# Patient Record
Sex: FEMALE | Race: BLACK OR AFRICAN AMERICAN | Employment: FULL TIME | URBAN - METROPOLITAN AREA
[De-identification: names, ages, dates, MRNs, and addresses within clinical notes are randomized per-mention and may not be internally consistent; named-entity substitution may affect disease eponyms.]

---

## 2023-03-02 LAB
BASOPHILS (10*3/UL) IN BLOOD BY AUTOMATED COUNT: 0.02 X10E9/L (ref 0–0.1)
BASOPHILS/100 LEUKOCYTES IN BLOOD BY AUTOMATED COUNT: 0.5 % (ref 0–2)
DEAMIDATED GLIADIN PEPTIDE IGA: <1 U/ML (ref 0–14)
DEAMIDATED GLIADIN PEPTIDE IGG: 2 U/ML (ref 0–14)
EOSINOPHILS (10*3/UL) IN BLOOD BY AUTOMATED COUNT: 0.03 X10E9/L (ref 0–0.7)
EOSINOPHILS/100 LEUKOCYTES IN BLOOD BY AUTOMATED COUNT: 0.7 % (ref 0–6)
ERYTHROCYTE DISTRIBUTION WIDTH (RATIO) BY AUTOMATED COUNT: 15.6 % (ref 11.5–14.5)
ERYTHROCYTE MEAN CORPUSCULAR HEMOGLOBIN CONCENTRATION (G/DL) BY AUTOMATED: 31.5 G/DL (ref 32–36)
ERYTHROCYTE MEAN CORPUSCULAR VOLUME (FL) BY AUTOMATED COUNT: 77 FL (ref 80–100)
ERYTHROCYTES (10*6/UL) IN BLOOD BY AUTOMATED COUNT: 4.51 X10E12/L (ref 4–5.2)
HEMATOCRIT (%) IN BLOOD BY AUTOMATED COUNT: 34.6 % (ref 36–46)
HEMOGLOBIN (G/DL) IN BLOOD: 10.9 G/DL (ref 12–16)
IMMATURE GRANULOCYTES/100 LEUKOCYTES IN BLOOD BY AUTOMATED COUNT: 0.2 % (ref 0–0.9)
LEUKOCYTES (10*3/UL) IN BLOOD BY AUTOMATED COUNT: 4.3 X10E9/L (ref 4.4–11.3)
LIPASE (U/L) IN SER/PLAS: 17 U/L (ref 9–82)
LYMPHOCYTES (10*3/UL) IN BLOOD BY AUTOMATED COUNT: 1.19 X10E9/L (ref 1.2–4.8)
LYMPHOCYTES/100 LEUKOCYTES IN BLOOD BY AUTOMATED COUNT: 27.9 % (ref 13–44)
MONOCYTES (10*3/UL) IN BLOOD BY AUTOMATED COUNT: 0.53 X10E9/L (ref 0.1–1)
MONOCYTES/100 LEUKOCYTES IN BLOOD BY AUTOMATED COUNT: 12.4 % (ref 2–10)
NEUTROPHILS (10*3/UL) IN BLOOD BY AUTOMATED COUNT: 2.49 X10E9/L (ref 1.2–7.7)
NEUTROPHILS/100 LEUKOCYTES IN BLOOD BY AUTOMATED COUNT: 58.3 % (ref 40–80)
NRBC (PER 100 WBCS) BY AUTOMATED COUNT: 0 /100 WBC (ref 0–0)
PLATELETS (10*3/UL) IN BLOOD AUTOMATED COUNT: 261 X10E9/L (ref 150–450)
TISSUE TRANSGLUTAMINASE IGG: <1 U/ML (ref 0–14)
TISSUE TRANSGLUTAMINASE, IGA: <1 U/ML (ref 0–14)

## 2023-03-20 DIAGNOSIS — R93.2 ABNORMAL LIVER ULTRASOUND: Primary | ICD-10-CM

## 2023-03-21 ENCOUNTER — TELEPHONE (OUTPATIENT)
Dept: PRIMARY CARE | Facility: CLINIC | Age: 33
End: 2023-03-21
Payer: COMMERCIAL

## 2023-03-21 NOTE — TELEPHONE ENCOUNTER
----- Message from Shakira Marshall, DO sent at 3/20/2023  9:48 PM EDT -----  Va,   The ultrasound of your abdomen shows most likely hemangiomas in the liver (which is a BENIGN cluster of blood vessels that are harmless). However, one of them is on the larger size and I would like to rule out any additional abnormalities with an MRI of the liver. This can be done with contrast but should not be an issue as your kidneys are functioning normally and can filter out the contrast that is injected. I will order the MRI of the liver and you can call the imaging to have this scheduled. Let me know if you have any questions.   Regards, Federico

## 2023-03-24 LAB — C. DIFFICILE TOXIN, PCR: NOT DETECTED

## 2023-03-28 LAB
CRYPTOSPORIDIUM ANTIGEN-DATA CONVERSION: NEGATIVE
GIARDIA LAMBLIA AG-DATA CONVERSION: NEGATIVE
HELICOBACTER PYLORI AG: POSITIVE
OVA + PARASITE EXAM: NEGATIVE

## 2023-03-30 DIAGNOSIS — A04.8 H. PYLORI INFECTION: Primary | ICD-10-CM

## 2023-03-30 RX ORDER — BISMUTH SUBSALICYLATE 525 MG/30ML
30 LIQUID ORAL 4 TIMES DAILY
Qty: 360 ML | Refills: 0 | Status: SHIPPED | OUTPATIENT
Start: 2023-03-30 | End: 2023-04-13

## 2023-03-30 RX ORDER — OMEPRAZOLE 20 MG/1
20 CAPSULE, DELAYED RELEASE ORAL
Qty: 28 CAPSULE | Refills: 0 | Status: SHIPPED | OUTPATIENT
Start: 2023-03-30 | End: 2023-04-26 | Stop reason: WASHOUT

## 2023-03-30 RX ORDER — METRONIDAZOLE 500 MG/1
500 TABLET ORAL 4 TIMES DAILY
Qty: 56 TABLET | Refills: 0 | Status: SHIPPED | OUTPATIENT
Start: 2023-03-30 | End: 2023-04-13

## 2023-03-30 RX ORDER — DOXYCYCLINE 100 MG/1
100 CAPSULE ORAL 2 TIMES DAILY
Qty: 28 CAPSULE | Refills: 0 | Status: SHIPPED | OUTPATIENT
Start: 2023-03-30 | End: 2023-04-13

## 2023-04-25 PROBLEM — R90.89 ABNORMAL BRAIN MRI: Status: ACTIVE | Noted: 2023-04-25

## 2023-04-25 PROBLEM — A60.00 GENITAL HERPES SIMPLEX TYPE 1 INFECTION: Status: ACTIVE | Noted: 2023-04-25

## 2023-04-25 PROBLEM — M79.7 FIBROMYALGIA: Status: ACTIVE | Noted: 2023-04-25

## 2023-04-25 PROBLEM — G43.019 INTRACTABLE MIGRAINE WITHOUT AURA AND WITHOUT STATUS MIGRAINOSUS: Status: ACTIVE | Noted: 2023-04-25

## 2023-04-25 PROBLEM — D56.3 BETA THALASSEMIA TRAIT: Status: ACTIVE | Noted: 2023-04-25

## 2023-04-25 PROBLEM — R51.9 CHRONIC DAILY HEADACHE: Status: ACTIVE | Noted: 2023-04-25

## 2023-04-26 ENCOUNTER — LAB (OUTPATIENT)
Dept: LAB | Facility: LAB | Age: 33
End: 2023-04-26
Payer: COMMERCIAL

## 2023-04-26 ENCOUNTER — OFFICE VISIT (OUTPATIENT)
Dept: PRIMARY CARE | Facility: CLINIC | Age: 33
End: 2023-04-26
Payer: COMMERCIAL

## 2023-04-26 VITALS
HEART RATE: 97 BPM | SYSTOLIC BLOOD PRESSURE: 121 MMHG | WEIGHT: 126 LBS | DIASTOLIC BLOOD PRESSURE: 79 MMHG | TEMPERATURE: 98.2 F | BODY MASS INDEX: 20.34 KG/M2

## 2023-04-26 DIAGNOSIS — R51.9 CHRONIC DAILY HEADACHE: ICD-10-CM

## 2023-04-26 DIAGNOSIS — R90.89 ABNORMAL BRAIN MRI: ICD-10-CM

## 2023-04-26 DIAGNOSIS — R52 MIGRATORY PAIN: ICD-10-CM

## 2023-04-26 DIAGNOSIS — A04.8 H. PYLORI INFECTION: Primary | ICD-10-CM

## 2023-04-26 PROCEDURE — 86618 LYME DISEASE ANTIBODY: CPT

## 2023-04-26 PROCEDURE — 99214 OFFICE O/P EST MOD 30 MIN: CPT | Performed by: INTERNAL MEDICINE

## 2023-04-26 PROCEDURE — 1036F TOBACCO NON-USER: CPT | Performed by: INTERNAL MEDICINE

## 2023-04-26 PROCEDURE — 36415 COLL VENOUS BLD VENIPUNCTURE: CPT

## 2023-04-26 NOTE — ASSESSMENT & PLAN NOTE
Still as of yet etiology, minimal response to exercise regimen and all other fibromyalgia treatments.   Will pursue Lyme disease testing.

## 2023-04-26 NOTE — PROGRESS NOTES
"Subjective     Patient ID: Va Diehl is a 32 y.o. female who presents for Follow-up.  HPI  89F here for followup visit after she was last seen last month for establishment of care, concerned regarding total body pain, chronic diarrhea and worsening headaches.     3/23: hemangiomas on ultrasound get MRI of liver with contrast, cdif negative which is pending, has an upcoming appointment scheduled with GI.   H.pylori positive treated with quadruple therapy   States bismuth and omeprazole created a significant amount of nausea, completed course last Friday   She continues to experience head pressure, head burning sensation, persistent nausea, only eating one meal a day (cannot stomach anything else), fatigue, soreness, neck and back pain.     - Possible diagnosis of fibromyalgia - failed trials of cymbalta, effexor, amitriptyline was not interested in medical management recommended graded exercise therapy. She has been trying to do exercises every day which does help with the pain somewhat but not significantly. She uses heat pads and lavender oils regularly which is the only thing that helps her significantly.   - Chronic diarrhea and epigastric pain - H.pylori positive treated with quadruple therapy pending upcoming appointment scheduled with gastroenterology.   - Migraine headaches and worsening headaches - negative neuro workup including brain imaging and LP, intolerant to topiramate, advised trial of magnesium and riboflavin and neurology followup. She was noted to have an expansile lesion in the right frontal bone recommended MRI brain with contrast which was performed 4/13 notable for \"unknown calvarial abnormality in the right frontal region cannot be further characterized\". She was recommended followup with neurology to determine next step. She continues to experience headaches.   - Anxiety - followed by counselor every work, stable related to undiagnosed medical condition. Continues to be heightened.   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     ROS:  Review of systems as stated above, otherwise unremarkable.    Objective   Physical Exam  General: Alert and oriented, in no apparent distress   HEENT: No conjunctival erythema, no external facial lesions   Lungs: Breathing comfortably  Skin: No evidence of skin breakdown.  Neuro: AAO x 3, answering questions appropriately, no obvious cranial nerve deficits    Assessment/Plan   Problem List Items Addressed This Visit          Nervous    Chronic daily headache    Migratory pain     Still as of yet etiology, minimal response to exercise regimen and all other fibromyalgia treatments.   Will pursue Lyme disease testing.          Relevant Orders    Lyme Ab Screen + Reflex To Immunoblot; >4 Wks Post Symptoms       Infectious/Inflammatory    H. pylori infection - Primary     S/p quadruple therapy though unclear if fully completed, due for test of cure 4 weeks after completion of regimen. Will  stool test.   Has upcoming appointment scheduled with gastroenterology         Relevant Orders    H. Pylori Antigen, Stool       Other    Abnormal brain MRI     With stable yet inconclusive findings on MRI, due for followup with neurology to determine next best steps.

## 2023-04-26 NOTE — ASSESSMENT & PLAN NOTE
S/p quadruple therapy though unclear if fully completed, due for test of cure 4 weeks after completion of regimen. Will  stool test.   Has upcoming appointment scheduled with gastroenterology

## 2023-05-01 LAB — LYME ANTIBODIES SCREEN: 0.4 LIV (ref 0–1.2)

## 2023-05-12 DIAGNOSIS — D18.03 HEPATIC HEMANGIOMA: Primary | ICD-10-CM

## 2023-08-15 LAB
ALANINE AMINOTRANSFERASE (SGPT) (U/L) IN SER/PLAS: 10 U/L (ref 7–45)
ALBUMIN (G/DL) IN SER/PLAS: 4.4 G/DL (ref 3.4–5)
ALKALINE PHOSPHATASE (U/L) IN SER/PLAS: 40 U/L (ref 33–110)
ASPARTATE AMINOTRANSFERASE (SGOT) (U/L) IN SER/PLAS: 18 U/L (ref 9–39)
BILIRUBIN DIRECT (MG/DL) IN SER/PLAS: 0.1 MG/DL (ref 0–0.3)
BILIRUBIN TOTAL (MG/DL) IN SER/PLAS: 0.5 MG/DL (ref 0–1.2)
HEPATITIS B VIRUS CORE AB (PRESENCE) IN SER/PLAS BY IMM: NONREACTIVE
HEPATITIS B VIRUS SURFACE AB (MIU/ML) IN SERUM: 600.8 MIU/ML
HEPATITIS B VIRUS SURFACE AG PRESENCE IN SERUM: NONREACTIVE
HEPATITIS C VIRUS AB PRESENCE IN SERUM: NONREACTIVE
PROTEIN TOTAL: 7.2 G/DL (ref 6.4–8.2)

## 2023-10-08 PROBLEM — F33.9 MAJOR DEPRESSIVE DISORDER, RECURRENT, UNSPECIFIED (CMS-HCC): Status: ACTIVE | Noted: 2021-05-27

## 2023-10-08 PROBLEM — D50.9 MICROCYTIC ANEMIA: Status: ACTIVE | Noted: 2023-10-08

## 2023-10-08 PROBLEM — R10.9 ABDOMINAL PAIN: Status: ACTIVE | Noted: 2023-10-08

## 2023-10-08 PROBLEM — F41.9 ANXIETY DISORDER, UNSPECIFIED: Status: ACTIVE | Noted: 2021-05-27

## 2023-10-08 PROBLEM — G89.29 CHRONIC BACK PAIN: Status: ACTIVE | Noted: 2023-10-08

## 2023-10-08 PROBLEM — R93.2 ABNORMAL MRI, LIVER: Status: ACTIVE | Noted: 2023-10-08

## 2023-10-08 PROBLEM — R51.9 HEADACHE: Status: ACTIVE | Noted: 2023-10-08

## 2023-10-08 PROBLEM — D18.03 HEPATIC HEMANGIOMA: Status: ACTIVE | Noted: 2023-10-08

## 2023-10-08 PROBLEM — R68.81 EARLY SATIETY: Status: ACTIVE | Noted: 2023-10-08

## 2023-10-08 PROBLEM — M89.9 SKULL LESION: Status: ACTIVE | Noted: 2022-10-05

## 2023-10-08 PROBLEM — M54.9 CHRONIC BACK PAIN: Status: ACTIVE | Noted: 2023-10-08

## 2023-10-08 PROBLEM — R14.0 BLOATING: Status: ACTIVE | Noted: 2023-10-08

## 2023-10-08 PROBLEM — Z91.199 MEDICALLY NONCOMPLIANT: Status: ACTIVE | Noted: 2023-10-08

## 2023-10-08 PROBLEM — K52.9 CHRONIC DIARRHEA: Status: ACTIVE | Noted: 2023-10-08

## 2023-10-08 RX ORDER — ALBUTEROL SULFATE 90 UG/1
2 AEROSOL, METERED RESPIRATORY (INHALATION) EVERY 4 HOURS PRN
COMMUNITY
Start: 2020-02-18

## 2023-10-08 RX ORDER — RIZATRIPTAN BENZOATE 5 MG/1
5 TABLET ORAL
COMMUNITY
Start: 2022-06-20

## 2023-10-08 RX ORDER — SUMATRIPTAN 50 MG/1
50 TABLET, FILM COATED ORAL ONCE AS NEEDED
COMMUNITY
Start: 2022-05-27

## 2023-10-08 RX ORDER — BUTALBITAL, ACETAMINOPHEN AND CAFFEINE 50; 325; 40 MG/1; MG/1; MG/1
1 TABLET ORAL
COMMUNITY
Start: 2022-10-30

## 2023-10-08 RX ORDER — POLYETHYLENE GLYCOL 3350 17 G/17G
17 POWDER, FOR SOLUTION ORAL
COMMUNITY
Start: 2018-07-02

## 2023-10-08 RX ORDER — IBUPROFEN 600 MG/1
600 TABLET ORAL EVERY 6 HOURS PRN
COMMUNITY
Start: 2022-07-06

## 2023-10-08 RX ORDER — ACYCLOVIR 400 MG/1
TABLET ORAL
COMMUNITY
Start: 2022-05-07

## 2023-10-08 RX ORDER — DULOXETIN HYDROCHLORIDE 30 MG/1
30 CAPSULE, DELAYED RELEASE ORAL 2 TIMES DAILY
COMMUNITY
Start: 2020-01-06

## 2023-10-08 RX ORDER — FLUTICASONE PROPIONATE 50 MCG
1 SPRAY, SUSPENSION (ML) NASAL
COMMUNITY
Start: 2020-02-18

## 2023-10-08 RX ORDER — BENZOCAINE AND MENTHOL 15; 2.6 MG/1; MG/1
1 LOZENGE ORAL EVERY 4 HOURS PRN
COMMUNITY
Start: 2023-08-31

## 2023-10-08 RX ORDER — BROMPHENIRAMINE MALEATE, PSEUDOEPHEDRINE HYDROCHLORIDE, AND DEXTROMETHORPHAN HYDROBROMIDE 2; 30; 10 MG/5ML; MG/5ML; MG/5ML
10 SYRUP ORAL EVERY 6 HOURS PRN
COMMUNITY
Start: 2023-08-31

## 2023-10-08 RX ORDER — PROPRANOLOL HYDROCHLORIDE 60 MG/1
CAPSULE, EXTENDED RELEASE ORAL
COMMUNITY
Start: 2022-06-20

## 2023-10-08 RX ORDER — VALACYCLOVIR HYDROCHLORIDE 1 G/1
TABLET, FILM COATED ORAL
COMMUNITY
Start: 2022-08-07

## 2023-10-08 RX ORDER — DULOXETIN HYDROCHLORIDE 60 MG/1
60 CAPSULE, DELAYED RELEASE ORAL
COMMUNITY
Start: 2020-06-03

## 2023-10-08 RX ORDER — CLONAZEPAM 0.5 MG/1
TABLET ORAL
COMMUNITY
Start: 2020-01-06

## 2023-10-08 RX ORDER — CYCLOBENZAPRINE HCL 10 MG
TABLET ORAL
COMMUNITY
Start: 2022-08-15

## 2023-10-08 RX ORDER — DESOGESTREL AND ETHINYL ESTRADIOL 21-5 (28)
1 KIT ORAL
COMMUNITY
Start: 2021-02-01

## 2023-10-08 RX ORDER — TOPIRAMATE 25 MG/1
TABLET ORAL
COMMUNITY
Start: 2022-08-15

## 2023-10-08 RX ORDER — GABAPENTIN 100 MG/1
1 CAPSULE ORAL 2 TIMES DAILY
COMMUNITY
Start: 2022-09-21

## 2023-10-08 RX ORDER — FERROUS SULFATE 325(65) MG
TABLET ORAL
COMMUNITY
Start: 2020-04-29

## 2023-10-08 RX ORDER — MELOXICAM 15 MG/1
1 TABLET ORAL DAILY
COMMUNITY
Start: 2022-10-12

## 2023-10-08 RX ORDER — DULOXETINE 40 MG/1
1 CAPSULE, DELAYED RELEASE ORAL DAILY
COMMUNITY
Start: 2021-10-06

## 2023-10-10 ENCOUNTER — OFFICE VISIT (OUTPATIENT)
Dept: RHEUMATOLOGY | Facility: CLINIC | Age: 33
End: 2023-10-10
Payer: COMMERCIAL

## 2023-10-10 ENCOUNTER — LAB (OUTPATIENT)
Dept: LAB | Facility: LAB | Age: 33
End: 2023-10-10
Payer: COMMERCIAL

## 2023-10-10 ENCOUNTER — ANCILLARY PROCEDURE (OUTPATIENT)
Dept: RADIOLOGY | Facility: CLINIC | Age: 33
End: 2023-10-10
Payer: COMMERCIAL

## 2023-10-10 DIAGNOSIS — M54.50 CHRONIC LOW BACK PAIN WITHOUT SCIATICA, UNSPECIFIED BACK PAIN LATERALITY: ICD-10-CM

## 2023-10-10 DIAGNOSIS — G89.29 CHRONIC LOW BACK PAIN WITHOUT SCIATICA, UNSPECIFIED BACK PAIN LATERALITY: ICD-10-CM

## 2023-10-10 DIAGNOSIS — M79.7 FIBROMYALGIA: ICD-10-CM

## 2023-10-10 DIAGNOSIS — R76.8 POSITIVE ANA (ANTINUCLEAR ANTIBODY): ICD-10-CM

## 2023-10-10 DIAGNOSIS — G89.29 CHRONIC LOW BACK PAIN WITHOUT SCIATICA, UNSPECIFIED BACK PAIN LATERALITY: Primary | ICD-10-CM

## 2023-10-10 DIAGNOSIS — M54.50 CHRONIC LOW BACK PAIN WITHOUT SCIATICA, UNSPECIFIED BACK PAIN LATERALITY: Primary | ICD-10-CM

## 2023-10-10 LAB
CCP IGG SERPL-ACNC: <1 U/ML
CRP SERPL-MCNC: <0.1 MG/DL
ERYTHROCYTE [SEDIMENTATION RATE] IN BLOOD BY WESTERGREN METHOD: 10 MM/H (ref 0–20)

## 2023-10-10 PROCEDURE — 72202 X-RAY EXAM SI JOINTS 3/> VWS: CPT | Mod: FY

## 2023-10-10 PROCEDURE — 85652 RBC SED RATE AUTOMATED: CPT

## 2023-10-10 PROCEDURE — 86200 CCP ANTIBODY: CPT

## 2023-10-10 PROCEDURE — 86140 C-REACTIVE PROTEIN: CPT

## 2023-10-10 PROCEDURE — 81381 HLA I TYPING 1 ALLELE HR: CPT

## 2023-10-10 PROCEDURE — 72100 X-RAY EXAM L-S SPINE 2/3 VWS: CPT | Performed by: RADIOLOGY

## 2023-10-10 PROCEDURE — 36415 COLL VENOUS BLD VENIPUNCTURE: CPT

## 2023-10-10 PROCEDURE — 1036F TOBACCO NON-USER: CPT | Performed by: INTERNAL MEDICINE

## 2023-10-10 PROCEDURE — 99204 OFFICE O/P NEW MOD 45 MIN: CPT | Performed by: INTERNAL MEDICINE

## 2023-10-10 PROCEDURE — 72202 X-RAY EXAM SI JOINTS 3/> VWS: CPT | Performed by: RADIOLOGY

## 2023-10-10 PROCEDURE — 72100 X-RAY EXAM L-S SPINE 2/3 VWS: CPT | Mod: FY

## 2023-10-10 NOTE — PROGRESS NOTES
Subjective   Patient ID: Va Diehl is a 33 y.o. female who presents for No chief complaint on file.    HPI.    33-year-old female with history of FMS, anxiety, depression, chronic headaches, iron deficiency anemia, hepatic hemangioma and cervalgia referred for back pain evaluation.    Patient reports she has been having back pain for the past 1 year.  She reports lower back pain and stiffness gets worse upon standing for more than 1 hour, lifting and walking.  She rates back pain up to 8/10 at times.  Symptoms improved with rest.  She has no history of waking up with back pain at night.  She has no history of weakness in the lower extremities.  She has good control of her bowel and bladder.    She feels fatigued at times.  She feels numbness and tingling in the lower extremities at times.  She has chronic headaches.  She is following neurology.  It is thought she may have tension headaches.    She stated that she was evaluated at [facility] 1 year ago and was diagnosed with fibromyalgia.  She could not tolerate gabapentin.  She stated that she is doing well without Cymbalta.    She recently started physical therapy.  She stated physical therapy initially worsened the back pain.  She was unable to do 10 sets of back exercises.  She now is able to do 5 cycles of back exercises at times.  Neck exercises help to improve the stiffness.    Previous labs showed positive LU at 1: 60 with negative ROCAEL.  RF, hepatitis serology, Lyme serology, HIV and syphilis are negative.  Sed rate and CRP were within normal limits.    X-ray of the neck and thoracic spine were unremarkable.    She has no family history of autoimmune disease, arthritis and  back pain.      Review of Systems.  She has no history of fever, chills, unintentional weight loss, sicca symptoms, mucocutaneous ulceration, Raynaud's phenomena, alopecia, iritis, uveitis, psoriasis, inflammatory bowel disease, recurrent infections, shortness of breath and  "pleuritic chest pain.    Objective .      8/30/2022    10:52 AM 10/14/2022    11:24 AM 11/4/2022    10:25 AM 11/18/2022     2:42 PM 3/2/2023    11:13 AM 4/26/2023     1:30 PM 6/5/2023     9:50 AM   Vitals   Systolic 99 105 98  127 121 110   Diastolic 60 66 65  86 79 72   Heart Rate 94 97 74  90 97 75   Temp 36.3 °C (97.4 °F) 36.4 °C (97.6 °F) 36.6 °C (97.9 °F)   36.8 °C (98.2 °F) 36.5 °C (97.7 °F)   Resp   16       Height (in)  1.676 m (5' 6\") 1.668 m (5' 5.67\") 1.668 m (5' 5.67\")   1.676 m (5' 6\")   Weight (lb) 143.25 144.38 138.01  136.13 126 117   BMI 23.12 kg/m2 23.3 kg/m2 22.5 kg/m2 22.5 kg/m2 22.19 kg/m2 20.54 kg/m2 18.88 kg/m2   BSA (m2) 1.74 m2 1.75 m2 1.7 m2 1.7 m2 1.69 m2 1.63 m2 1.57 m2   Visit Report      Report       Physical Exam  Gen. AAO x3, NAD.  HEENT: No pallor or icterus, PERRLA, EOMI. Oropharynx is clear. MM moist,Parotid glands  not enlarged. No cervical lymphadenopathy .  Skin: No rashes.  Heart: S1, S2/ RRR. No murmurs or gallops.  Lungs: CTA B.  Abdomen: Soft, NT/ND, BS regular.  MSK: No.swelling or tenderness of the  upper or lower extremity joints with full ROM, Neck,spine and Jim SI with out tenderness.finger to floor distance 8 cm.  Schober 3.5 cm.  Neuro: CN II-XII intact. Sensation to touch intact.Strength 5/5 throughout. DTR 2+ and symmetrical.  Psych:Appropriate mood and behavior  EXT: No edema    Assessment/Plan   33-year-old female with history of FMS, anxiety, depression, chronic headaches, iron deficiency anemia, hepatic hemangioma and cervalgia referred for back pain evaluation.    #1: Chronic back pain.  It is unlikely she has inflammatory arthritis or spondyloarthropathy however will obtain labs and completion of the work-up.  -Patient was encouraged to continue physical therapy.  #2.  Positive LU.  No evidence of autoimmune connective tissue disease activity.  Patient was reassured.    #3: Fibromyalgia concepts of pathophysiology, available management and outcomes discussed " in detail.  She is following with psychologist.  She will consider to resume antidepressants.     This note was partially generated using the Dragon Voice recognition system. There may be some incorrect wording, spelling and/or spelling errors or punctuation errors that were not corrected prior to committing the note to the medical record.    Patient Active Problem List   Diagnosis    Abnormal brain MRI    Beta thalassemia trait    Chronic daily headache    Genital herpes simplex type 1 infection    Fibromyalgia    Intractable migraine without aura and without status migrainosus    H. pylori infection    Migratory pain    Abdominal pain    Abnormal MRI, liver    Anxiety disorder, unspecified    Bloating    Breast cyst    Chronic diarrhea    Early satiety    Hepatic hemangioma    Low grade squamous intraepithelial lesion on cytologic smear of cervix (LGSIL)    Major depressive disorder, recurrent, unspecified (CMS/HCC)    Medically noncompliant    Menorrhagia    Iron deficiency anemia    Microcytic anemia    Refraction disorder    Skull lesion    Cervicalgia    Chronic back pain    Headache      No past surgical history on file.   Social History     Tobacco Use    Smoking status: Never    Smokeless tobacco: Never   Substance Use Topics    Alcohol use: Never      Family History   Problem Relation Name Age of Onset    Cerebral aneurysm Mother      Hypertension Mother      Stroke Father      Colon cancer Mother's Sister      Stroke Maternal Grandmother        No Known Allergies   Current Outpatient Medications   Medication Instructions    acyclovir (Zovirax) 400 mg tablet Acyclovir 400 MG Oral Tablet   Quantity: 30  Refills: 0        Start : 7-May-2022   Active    albuterol (Proventil HFA) 90 mcg/actuation inhaler 2 puffs, inhalation, Every 4 hours PRN    benzocaine-menthoL (Cepacol Sore Throat, hanna-men,) 15-2.6 mg lozenge 1 lozenge, Mouth/Throat, Every 4 hours PRN    brompheniramine-pseudoeph-DM 2-30-10 mg/5 mL syrup  10 mL, oral, Every 6 hours PRN    butalbital-acetaminophen-caff -40 mg tablet 1 tablet, oral    clonazePAM (KlonoPIN) 0.5 mg tablet clonazePAM 0.5 MG Oral Tablet   Quantity: 15  Refills: 0        Start : 6-Oct-2021   Active    cyclobenzaprine (Flexeril) 10 mg tablet Cyclobenzaprine HCl - 10 MG Oral Tablet   Quantity: 90  Refills: 0        Start : 15-Aug-2022   Active    desog-e.estradioL/e.estradioL (Kariva) 0.15-0.02 mgx21 /0.01 mg x 5 tablet 1 tablet, oral, Daily RT    DULoxetine (CYMBALTA) 30 mg, oral, 2 times daily    DULoxetine (CYMBALTA) 60 mg, oral, Daily RT    DULoxetine 40 mg DR capsule 1 capsule, oral, Daily    ferrous sulfate 325 (65 Fe) MG tablet Ferrous Sulfate 325 (65 Fe) MG Oral Tablet   Quantity: 30  Refills: 0        Start : 8-Jul-2022   Active    fluticasone (Flonase) 50 mcg/actuation nasal spray 1 spray, Each Nostril    gabapentin (Neurontin) 100 mg capsule 1 capsule, oral, 2 times daily    ibuprofen (IBU) 600 mg, oral, Every 6 hours PRN    iron,carb/vit C/vit B12/folic (IRON 100 PLUS ORAL) Iron 100 Plus   Quantity: 0   Refills: 0   Ordered: 18-Nov-2022  Evelyn Orantes Generic Substitution Allowed    loratadine-pseudoephedrine (Claritin-D 24-hour)  mg 24 hr tablet 1 tablet, oral, Daily    meloxicam (Mobic) 15 mg tablet 1 tablet, oral, Daily    polyethylene glycol (GLYCOLAX, MIRALAX) 17 g, oral, Daily RT, MIX IN 8 OZ OF WATER    propranolol LA (Inderal LA) 60 mg 24 hr capsule oral    rimegepant (Nurtec ODT) 75 mg tablet,disintegrating 1 tablet, oral, Daily PRN    rizatriptan (MAXALT) 5 mg, oral    SUMAtriptan (IMITREX) 50 mg, oral, Once as needed    topiramate (Topamax) 25 mg tablet Topiramate 25 MG Oral Tablet   Quantity: 60  Refills: 0        Start : 15-Aug-2022   Active    valACYclovir (Valtrex) 1 gram tablet Take by mouth.

## 2023-10-18 LAB — HLAB27 TYPING: NEGATIVE

## 2023-10-20 ENCOUNTER — APPOINTMENT (OUTPATIENT)
Dept: GASTROENTEROLOGY | Facility: HOSPITAL | Age: 33
End: 2023-10-20
Payer: COMMERCIAL

## 2023-10-20 DIAGNOSIS — K21.9 GASTRO-ESOPHAGEAL REFLUX DISEASE WITHOUT ESOPHAGITIS: ICD-10-CM

## 2023-11-10 NOTE — PROGRESS NOTES
Pending MRI approval, requested further documentation recommended regarding need for repeat MRI result. As we do not know what the indeterminate lesion may be, a repeat 6 month followup was recommended. This test may impact further management recommendations and may impact recommendations for a possible procedure.     Fax# [phone]

## 2023-11-13 ENCOUNTER — APPOINTMENT (OUTPATIENT)
Dept: RADIOLOGY | Facility: CLINIC | Age: 33
End: 2023-11-13
Payer: COMMERCIAL

## 2023-12-11 ENCOUNTER — APPOINTMENT (OUTPATIENT)
Dept: RADIOLOGY | Facility: CLINIC | Age: 33
End: 2023-12-11
Payer: COMMERCIAL

## 2023-12-11 ENCOUNTER — ANCILLARY PROCEDURE (OUTPATIENT)
Dept: RADIOLOGY | Facility: CLINIC | Age: 33
End: 2023-12-11
Payer: COMMERCIAL

## 2023-12-11 VITALS — HEIGHT: 66 IN | WEIGHT: 117.06 LBS | BODY MASS INDEX: 18.81 KG/M2

## 2023-12-11 DIAGNOSIS — D18.03 HEMANGIOMA OF INTRA-ABDOMINAL STRUCTURES: ICD-10-CM

## 2023-12-11 PROCEDURE — A9575 INJ GADOTERATE MEGLUMI 0.1ML: HCPCS | Mod: SE | Performed by: INTERNAL MEDICINE

## 2023-12-11 PROCEDURE — 74183 MRI ABD W/O CNTR FLWD CNTR: CPT

## 2023-12-11 PROCEDURE — 74183 MRI ABD W/O CNTR FLWD CNTR: CPT | Performed by: RADIOLOGY

## 2023-12-11 PROCEDURE — 2550000001 HC RX 255 CONTRASTS: Mod: SE | Performed by: INTERNAL MEDICINE

## 2023-12-11 RX ORDER — GADOTERATE MEGLUMINE 376.9 MG/ML
10 INJECTION INTRAVENOUS
Status: COMPLETED | OUTPATIENT
Start: 2023-12-11 | End: 2023-12-11

## 2023-12-11 RX ADMIN — GADOTERATE MEGLUMINE 10 ML: 376.9 INJECTION INTRAVENOUS at 13:39

## 2023-12-14 ENCOUNTER — ANESTHESIA (OUTPATIENT)
Dept: GASTROENTEROLOGY | Facility: HOSPITAL | Age: 33
End: 2023-12-14
Payer: COMMERCIAL

## 2023-12-14 ENCOUNTER — ANESTHESIA EVENT (OUTPATIENT)
Dept: GASTROENTEROLOGY | Facility: HOSPITAL | Age: 33
End: 2023-12-14
Payer: COMMERCIAL

## 2023-12-14 ENCOUNTER — HOSPITAL ENCOUNTER (OUTPATIENT)
Dept: GASTROENTEROLOGY | Facility: HOSPITAL | Age: 33
Discharge: HOME | End: 2023-12-14
Payer: COMMERCIAL

## 2023-12-14 VITALS
BODY MASS INDEX: 19.1 KG/M2 | TEMPERATURE: 98.2 F | HEIGHT: 66 IN | HEART RATE: 62 BPM | RESPIRATION RATE: 17 BRPM | WEIGHT: 118.83 LBS | DIASTOLIC BLOOD PRESSURE: 71 MMHG | SYSTOLIC BLOOD PRESSURE: 111 MMHG | OXYGEN SATURATION: 100 %

## 2023-12-14 DIAGNOSIS — K21.9 GASTRO-ESOPHAGEAL REFLUX DISEASE WITHOUT ESOPHAGITIS: ICD-10-CM

## 2023-12-14 DIAGNOSIS — A04.8 BACTERIAL INFECTION DUE TO H. PYLORI: Primary | ICD-10-CM

## 2023-12-14 LAB — PREGNANCY TEST URINE, POC: NEGATIVE

## 2023-12-14 PROCEDURE — A43239 PR EDG TRANSORAL BIOPSY SINGLE/MULTIPLE: Performed by: ANESTHESIOLOGIST ASSISTANT

## 2023-12-14 PROCEDURE — 81025 URINE PREGNANCY TEST: CPT | Performed by: INTERNAL MEDICINE

## 2023-12-14 PROCEDURE — 2500000004 HC RX 250 GENERAL PHARMACY W/ HCPCS (ALT 636 FOR OP/ED): Performed by: ANESTHESIOLOGIST ASSISTANT

## 2023-12-14 PROCEDURE — 43239 EGD BIOPSY SINGLE/MULTIPLE: CPT | Performed by: INTERNAL MEDICINE

## 2023-12-14 PROCEDURE — 88305 TISSUE EXAM BY PATHOLOGIST: CPT | Mod: TC,SUR,AHULAB | Performed by: INTERNAL MEDICINE

## 2023-12-14 PROCEDURE — A43239 PR EDG TRANSORAL BIOPSY SINGLE/MULTIPLE: Performed by: ANESTHESIOLOGY

## 2023-12-14 PROCEDURE — 88342 IMHCHEM/IMCYTCHM 1ST ANTB: CPT

## 2023-12-14 PROCEDURE — 7100000010 HC PHASE TWO TIME - EACH INCREMENTAL 1 MINUTE

## 2023-12-14 PROCEDURE — 3700000001 HC GENERAL ANESTHESIA TIME - INITIAL BASE CHARGE

## 2023-12-14 PROCEDURE — 3700000002 HC GENERAL ANESTHESIA TIME - EACH INCREMENTAL 1 MINUTE

## 2023-12-14 PROCEDURE — 7100000009 HC PHASE TWO TIME - INITIAL BASE CHARGE

## 2023-12-14 PROCEDURE — 88305 TISSUE EXAM BY PATHOLOGIST: CPT

## 2023-12-14 RX ORDER — PROPOFOL 10 MG/ML
INJECTION, EMULSION INTRAVENOUS CONTINUOUS PRN
Status: DISCONTINUED | OUTPATIENT
Start: 2023-12-14 | End: 2023-12-14

## 2023-12-14 RX ORDER — FENTANYL CITRATE 50 UG/ML
INJECTION, SOLUTION INTRAMUSCULAR; INTRAVENOUS AS NEEDED
Status: DISCONTINUED | OUTPATIENT
Start: 2023-12-14 | End: 2023-12-14

## 2023-12-14 RX ORDER — MIDAZOLAM HYDROCHLORIDE 1 MG/ML
INJECTION INTRAMUSCULAR; INTRAVENOUS AS NEEDED
Status: DISCONTINUED | OUTPATIENT
Start: 2023-12-14 | End: 2023-12-14

## 2023-12-14 RX ADMIN — PROPOFOL 200 MCG/KG/MIN: 10 INJECTION, EMULSION INTRAVENOUS at 11:17

## 2023-12-14 RX ADMIN — MIDAZOLAM HYDROCHLORIDE 2 MG: 1 INJECTION INTRAMUSCULAR; INTRAVENOUS at 11:17

## 2023-12-14 RX ADMIN — SODIUM CHLORIDE, POTASSIUM CHLORIDE, SODIUM LACTATE AND CALCIUM CHLORIDE: 600; 310; 30; 20 INJECTION, SOLUTION INTRAVENOUS at 11:04

## 2023-12-14 RX ADMIN — FENTANYL CITRATE 50 MCG: 50 INJECTION, SOLUTION INTRAMUSCULAR; INTRAVENOUS at 11:18

## 2023-12-14 RX ADMIN — FENTANYL CITRATE 50 MCG: 50 INJECTION, SOLUTION INTRAMUSCULAR; INTRAVENOUS at 11:15

## 2023-12-14 ASSESSMENT — PAIN - FUNCTIONAL ASSESSMENT
PAIN_FUNCTIONAL_ASSESSMENT: 0-10

## 2023-12-14 ASSESSMENT — PAIN SCALES - GENERAL
PAINLEVEL_OUTOF10: 0 - NO PAIN

## 2023-12-14 ASSESSMENT — COLUMBIA-SUICIDE SEVERITY RATING SCALE - C-SSRS
1. IN THE PAST MONTH, HAVE YOU WISHED YOU WERE DEAD OR WISHED YOU COULD GO TO SLEEP AND NOT WAKE UP?: NO
2. HAVE YOU ACTUALLY HAD ANY THOUGHTS OF KILLING YOURSELF?: NO
6. HAVE YOU EVER DONE ANYTHING, STARTED TO DO ANYTHING, OR PREPARED TO DO ANYTHING TO END YOUR LIFE?: NO

## 2023-12-14 ASSESSMENT — ENCOUNTER SYMPTOMS: CONSTITUTIONAL NEGATIVE: 1

## 2023-12-14 NOTE — ANESTHESIA POSTPROCEDURE EVALUATION
Patient: Va Diehl    Procedure Summary       Date: 12/14/23 Room / Location: [facility]; [facility]    Anesthesia Start: 1114 Anesthesia Stop: 1128    Procedure: EGD Diagnosis:       Gastro-esophageal reflux disease without esophagitis      Bacterial infection due to H. pylori    Scheduled Providers: Delia Stoner MD; Harrison Segura MD; RACHAEL Howard; Parris Prakash RN; Jose Alberto Flores DO; Pham Mancera MA Responsible Provider: Harrison Segura MD    Anesthesia Type: MAC ASA Status: 2            Anesthesia Type: MAC    Vitals Value Taken Time   /71 12/14/23 1159   Temp 36.8 °C (98.2 °F) 12/14/23 1129   Pulse 62 12/14/23 1159   Resp 17 12/14/23 1159   SpO2 100 % 12/14/23 1159       Anesthesia Post Evaluation    Patient location during evaluation: PACU  Patient participation: complete - patient participated  Level of consciousness: awake and alert  Pain management: adequate  Airway patency: patent  Cardiovascular status: acceptable and hemodynamically stable  Respiratory status: acceptable, spontaneous ventilation and nonlabored ventilation  Hydration status: acceptable  Postoperative Nausea and Vomiting: none        There were no known notable events for this encounter.

## 2023-12-14 NOTE — H&P
"History Of Present Illness  Va Diehl is a 33 y.o. female presenting with GERD symptoms and prior H pylori infection here for an upper endoscopy as referred by Dr. Yulissa Oconnor.     Past Medical History  Past Medical History:   Diagnosis Date    Anxiety and depression     Chronic headaches     Fibromyalgia        Surgical History  History reviewed. No pertinent surgical history.     Social History  She reports that she has never smoked. She has never been exposed to tobacco smoke. She has never used smokeless tobacco. She reports that she does not drink alcohol and does not use drugs.    Family History  Family History   Problem Relation Name Age of Onset    Cerebral aneurysm Mother      Hypertension Mother      Stroke Father      Colon cancer Mother's Sister      Stroke Maternal Grandmother          Allergies  Patient has no known allergies.    Review of Systems   Constitutional: Negative.    HENT:  Positive for congestion.         Physical Exam  Constitutional:       Appearance: Normal appearance.   Cardiovascular:      Rate and Rhythm: Normal rate and regular rhythm.   Pulmonary:      Effort: Pulmonary effort is normal.      Breath sounds: Normal breath sounds.   Abdominal:      General: Abdomen is flat.      Palpations: Abdomen is soft.   Neurological:      Mental Status: She is alert.   Psychiatric:         Behavior: Behavior normal.         Thought Content: Thought content normal.         Judgment: Judgment normal.      Comments: Anxious           Last Recorded Vitals  Blood pressure 117/64, pulse 76, temperature 37.3 °C (99.1 °F), temperature source Temporal, resp. rate 15, height 1.676 m (5' 6\"), weight 53.9 kg (118 lb 13.3 oz), last menstrual period 12/07/2023, SpO2 100 %, not currently breastfeeding.    Relevant Results             Assessment/Plan   Active Problems:  There are no active Hospital Problems.      EGD for GERD and H. Pylori assessment        I spent 15 minutes in the professional and " overall care of this patient.      Jose Alberto Flores, DO

## 2023-12-14 NOTE — ANESTHESIA PREPROCEDURE EVALUATION
Patient: Va Diehl    Procedure Information       Date/Time: 12/14/23 1000    Scheduled providers: Delia Stoner MD; Harrison Segura MD; Parris Prakash RN; Jeffrey Rodriguez MA; RACHAEL Howard    Procedure: EGD    Location: [facility]; [facility]            Relevant Problems   Anesthesia   No history of complications History of anesthesia complications (No prior anesthesia, no family problems known)      Cardiovascular   (+) Hepatic hemangioma      GI   (+) Chronic diarrhea      /Renal   (+) Hepatic hemangioma      Neuro/Psych   (+) Anxiety disorder, unspecified   (+) Chronic daily headache   (+) Major depressive disorder, recurrent, unspecified (CMS/HCC)      Hematology   (+) Beta thalassemia trait   (+) Iron deficiency anemia   (+) Microcytic anemia      Musculoskeletal   (+) Fibromyalgia      Infectious Disease   (+) Genital herpes simplex type 1 infection   (+) H. pylori infection       Clinical information reviewed:   Tobacco  Allergies  Meds   Med Hx  Surg Hx  OB Status  Fam Hx  Soc   Hx        NPO/Void Status  Carbonhydrate Drink Given Prior to Surgery? : N  Date of Last Liquid: 12/14/23  Time of Last Liquid: 0800  Date of Last Solid: 12/13/23  Time of Last Solid: 2100  Last Intake Type: Clear fluids (water)  Time of Last Void: 0910           Past Medical History:   Diagnosis Date    Anxiety and depression     Chronic headaches     Fibromyalgia       History reviewed. No pertinent surgical history.  Social History     Tobacco Use    Smoking status: Never     Passive exposure: Never    Smokeless tobacco: Never   Vaping Use    Vaping Use: Never used   Substance Use Topics    Alcohol use: Never    Drug use: Never      Current Outpatient Medications   Medication Instructions    acyclovir (Zovirax) 400 mg tablet Acyclovir 400 MG Oral Tablet   Quantity: 30  Refills: 0        Start : 7-May-2022   Active    albuterol (Proventil HFA) 90 mcg/actuation inhaler 2 puffs,  inhalation, Every 4 hours PRN    benzocaine-menthoL (Cepacol Sore Throat, hanna-men,) 15-2.6 mg lozenge 1 lozenge, Mouth/Throat, Every 4 hours PRN    brompheniramine-pseudoeph-DM 2-30-10 mg/5 mL syrup 10 mL, oral, Every 6 hours PRN    butalbital-acetaminophen-caff -40 mg tablet 1 tablet, oral    clonazePAM (KlonoPIN) 0.5 mg tablet clonazePAM 0.5 MG Oral Tablet   Quantity: 15  Refills: 0        Start : 6-Oct-2021   Active    cyclobenzaprine (Flexeril) 10 mg tablet Cyclobenzaprine HCl - 10 MG Oral Tablet   Quantity: 90  Refills: 0        Start : 15-Aug-2022   Active    desog-e.estradioL/e.estradioL (Kariva) 0.15-0.02 mgx21 /0.01 mg x 5 tablet 1 tablet, oral, Daily RT    DULoxetine (CYMBALTA) 30 mg, oral, 2 times daily    DULoxetine (CYMBALTA) 60 mg, oral, Daily RT    DULoxetine 40 mg DR capsule 1 capsule, oral, Daily    ferrous sulfate 325 (65 Fe) MG tablet Ferrous Sulfate 325 (65 Fe) MG Oral Tablet   Quantity: 30  Refills: 0        Start : 8-Jul-2022   Active    fluticasone (Flonase) 50 mcg/actuation nasal spray 1 spray, Each Nostril    gabapentin (Neurontin) 100 mg capsule 1 capsule, oral, 2 times daily    ibuprofen (IBU) 600 mg, oral, Every 6 hours PRN    iron,carb/vit C/vit B12/folic (IRON 100 PLUS ORAL) Iron 100 Plus   Quantity: 0   Refills: 0   Ordered: 18-Nov-2022  Evelyn Orantes Generic Substitution Allowed    loratadine-pseudoephedrine (Claritin-D 24-hour)  mg 24 hr tablet 1 tablet, oral, Daily    meloxicam (Mobic) 15 mg tablet 1 tablet, oral, Daily    polyethylene glycol (GLYCOLAX, MIRALAX) 17 g, oral, Daily RT, MIX IN 8 OZ OF WATER    propranolol LA (Inderal LA) 60 mg 24 hr capsule oral    rimegepant (Nurtec ODT) 75 mg tablet,disintegrating 1 tablet, oral, Daily PRN    rizatriptan (MAXALT) 5 mg, oral    SUMAtriptan (IMITREX) 50 mg, oral, Once as needed    topiramate (Topamax) 25 mg tablet Topiramate 25 MG Oral Tablet   Quantity: 60  Refills: 0        Start : 15-Aug-2022   Active    valACYclovir  "(Valtrex) 1 gram tablet Take by mouth.      No Known Allergies     Chemistry    Lab Results   Component Value Date/Time     11/04/2022 1128    K 3.6 11/04/2022 1128     11/04/2022 1128    CO2 27 11/04/2022 1128    BUN 8 11/04/2022 1128    CREATININE 0.78 11/04/2022 1128    Lab Results   Component Value Date/Time    CALCIUM 9.7 11/04/2022 1128    ALKPHOS 40 08/15/2023 1403    AST 18 08/15/2023 1403    ALT 10 08/15/2023 1403    BILITOT 0.5 08/15/2023 1403          Lab Results   Component Value Date/Time    WBC 4.3 (L) 03/02/2023 1343    HGB 10.9 (L) 03/02/2023 1343    HCT 34.6 (L) 03/02/2023 1343     03/02/2023 1343     No results found for: \"PROTIME\", \"PTT\", \"INR\"  No results found for this or any previous visit (from the past 4464 hour(s)).  No results found for this or any previous visit from the past 1095 days.       Visit Vitals  /64   Pulse 76   Temp 37.3 °C (99.1 °F) (Temporal)   Resp 15   Ht 1.676 m (5' 6\")   Wt 53.9 kg (118 lb 13.3 oz)   LMP 12/07/2023 (Approximate)   SpO2 100%   Breastfeeding No   BMI 19.18 kg/m²   OB Status Having periods   Smoking Status Never   BSA 1.58 m²        Physical Exam    Airway  Mallampati: II  TM distance: >3 FB  Neck ROM: full     Cardiovascular   Rhythm: regular  Rate: normal     Dental - normal exam     Pulmonary   Breath sounds clear to auscultation     Abdominal - normal exam              Anesthesia Plan    ASA 2     MAC     intravenous induction   Anesthetic plan and risks discussed with patient.        "

## 2023-12-14 NOTE — PERIOPERATIVE NURSING NOTE
"1129 Arrival to post endo. Awake and alert. Crying, states she feels \"overwhelmed\". Mother brought to bedside. Dr. Flores in to speak to patient.     1144 Discharge instructions reviewed with patient and mother, verbalized understanding. Tolerating PO.     1208 PIV removed and patient getting dressed.    1220 Transported to [facility] and discharged to home with mother.   "

## 2023-12-14 NOTE — DISCHARGE INSTRUCTIONS

## 2023-12-28 LAB
LAB AP ASR DISCLAIMER: NORMAL
LABORATORY COMMENT REPORT: NORMAL
PATH REPORT.FINAL DX SPEC: NORMAL
PATH REPORT.GROSS SPEC: NORMAL
PATH REPORT.TOTAL CANCER: NORMAL

## 2024-02-05 ENCOUNTER — APPOINTMENT (OUTPATIENT)
Dept: OBSTETRICS AND GYNECOLOGY | Facility: CLINIC | Age: 34
End: 2024-02-05
Payer: COMMERCIAL

## 2024-05-20 ENCOUNTER — APPOINTMENT (OUTPATIENT)
Dept: OBSTETRICS AND GYNECOLOGY | Facility: CLINIC | Age: 34
End: 2024-05-20
Payer: COMMERCIAL

## 2024-06-18 ENCOUNTER — APPOINTMENT (OUTPATIENT)
Dept: NEUROLOGY | Facility: CLINIC | Age: 34
End: 2024-06-18
Payer: COMMERCIAL

## 2024-06-18 ENCOUNTER — APPOINTMENT (OUTPATIENT)
Dept: RADIOLOGY | Facility: CLINIC | Age: 34
End: 2024-06-18
Payer: COMMERCIAL

## 2024-06-18 VITALS
WEIGHT: 129 LBS | HEART RATE: 86 BPM | TEMPERATURE: 97.8 F | DIASTOLIC BLOOD PRESSURE: 74 MMHG | HEIGHT: 65 IN | SYSTOLIC BLOOD PRESSURE: 103 MMHG | BODY MASS INDEX: 21.49 KG/M2

## 2024-06-18 DIAGNOSIS — R20.2 NUMBNESS AND TINGLING: ICD-10-CM

## 2024-06-18 DIAGNOSIS — R20.0 NUMBNESS AND TINGLING: ICD-10-CM

## 2024-06-18 DIAGNOSIS — G43.019 INTRACTABLE MIGRAINE WITHOUT AURA AND WITHOUT STATUS MIGRAINOSUS: Primary | ICD-10-CM

## 2024-06-18 PROCEDURE — 1036F TOBACCO NON-USER: CPT | Performed by: PSYCHIATRY & NEUROLOGY

## 2024-06-18 PROCEDURE — 99215 OFFICE O/P EST HI 40 MIN: CPT | Performed by: PSYCHIATRY & NEUROLOGY

## 2024-06-18 RX ORDER — ERENUMAB-AOOE 70 MG/ML
70 INJECTION SUBCUTANEOUS
Qty: 1 ML | Refills: 5 | Status: SHIPPED | OUTPATIENT
Start: 2024-06-18 | End: 2024-12-15

## 2024-06-18 NOTE — PROGRESS NOTES
NEUROLOGY OUTPATIENT INITIAL VISIT NOTE    Assessment/Plan   Diagnoses and all orders for this visit:  Intractable migraine without aura and without status migrainosus  -     erenumab (Aimovig Autoinjector) 70 mg/mL injection; Inject 1 mL (70 mg) under the skin every 28 (twenty-eight) days.  Numbness and tingling      IMPRESSION:  Intractable migraine without aura.  Tingling of the legs without objective evidence for neuropathy or radiculopathy on exam.  Extensive neurological workup is negative.  She has what appears to be a hemangioma of the right frontal region, but this would not explain the holocranial headache.    PLAN:  I plan to start an injectable CGRP inhibitor, given failure of multiple medications.  Her insurance covers Aimovig and Emgality.  I will start with Aimovig 70 mg monthly.  I plan EMG/NCS of both legs to rule out a neuropathic process that would be causing tingling in the legs.  I will see her back at the time of the EMG/NCS.      Gilmar Venegas Jr., M.D., FAAN     --------      Subjective     Va Diehl is a 33 y.o. year old, right-handed femaleelf-referred for headache.  History comes from the patient and from review of the available , [facility], and [facility] EMRs.    HPI  In March 2022, she developed holocranial burning and pressure without a specific precipitant.  They were daily and were severe enough to bother her.  They were associated with photophobia, mild phonophobia, and nausea, but no emesis.  Initially, ibuprofen was helpful, but it is not any longer.  The discomfort is still every day, but worse at night.  If she moves too fast, she can feel imbalanced.      She has separate numbness in both legs that is intermittent since March 2022.  She also has chronic, intermittent cervical and lumbar pain.    She had an extensive workup at [facility] for multiple somatic symptoms including cranial MRI and lumbar puncture, neither revealing significant pathology other than right frontal hemangioma;  the hemangioma has been followed with two more MRIs since the initial one and it hasn't changed.    HEADACHE HISTORY:    Number of headache days per week/month:  every day, 30 days a month  Number of months with headache frequency: two years.    Abortive medications tried for migraine: Ibuprofen (not helpful), sumatriptan (not helpful), rizatriptan (not helpful), Nurtec (not helpful)     Prophylactic medications tried for migraine: Propranolol (lightheaded), topiramate (not helpful at 50 mg daily), gabapentin (nausea), duloxetine (more for depression/stress/anxiety, no help for headaches while on it)    Denies family history of headache.  Review of Systems   All other systems reviewed and are negative.      Past Medical History:   Diagnosis Date    Anxiety and depression     Chronic headaches     Fibromyalgia      No past surgical history on file.  Social History     Tobacco Use    Smoking status: Never     Passive exposure: Never    Smokeless tobacco: Never   Substance Use Topics    Alcohol use: Never     family history includes Cerebral aneurysm in her mother; Colon cancer in her mother's sister; Hypertension in her mother; Stroke in her father and maternal grandmother.    Current Outpatient Medications:     acyclovir (Zovirax) 400 mg tablet, Acyclovir 400 MG Oral Tablet  Quantity: 30  Refills: 0      Start : 7-May-2022  Active, Disp: , Rfl:     albuterol (Proventil HFA) 90 mcg/actuation inhaler, Inhale 2 puffs every 4 hours if needed for wheezing or shortness of breath., Disp: , Rfl:     benzocaine-menthoL (Cepacol Sore Throat, hanna-men,) 15-2.6 mg lozenge, Dissolve 1 lozenge in the mouth every 4 hours if needed., Disp: , Rfl:     brompheniramine-pseudoeph-DM 2-30-10 mg/5 mL syrup, Take 10 mL by mouth every 6 hours if needed for congestion or cough., Disp: , Rfl:     butalbital-acetaminophen-caff -40 mg tablet, Take 1 tablet by mouth., Disp: , Rfl:     clonazePAM (KlonoPIN) 0.5 mg tablet, clonazePAM 0.5 MG  Oral Tablet  Quantity: 15  Refills: 0      Start : 6-Oct-2021  Active, Disp: , Rfl:     cyclobenzaprine (Flexeril) 10 mg tablet, Cyclobenzaprine HCl - 10 MG Oral Tablet  Quantity: 90  Refills: 0      Start : 15-Aug-2022  Active, Disp: , Rfl:     desog-e.estradioL/e.estradioL (Kariva) 0.15-0.02 mgx21 /0.01 mg x 5 tablet, Take 1 tablet by mouth once daily., Disp: , Rfl:     DULoxetine (Cymbalta) 30 mg DR capsule, Take 1 capsule (30 mg) by mouth twice a day., Disp: , Rfl:     DULoxetine (Cymbalta) 60 mg DR capsule, Take 1 capsule (60 mg) by mouth once daily., Disp: , Rfl:     DULoxetine 40 mg DR capsule, Take 1 capsule (40 mg) by mouth once daily., Disp: , Rfl:     ferrous sulfate 325 (65 Fe) MG tablet, Ferrous Sulfate 325 (65 Fe) MG Oral Tablet  Quantity: 30  Refills: 0      Start : 8-Jul-2022  Active, Disp: , Rfl:     fluticasone (Flonase) 50 mcg/actuation nasal spray, Administer 1 spray into each nostril., Disp: , Rfl:     gabapentin (Neurontin) 100 mg capsule, Take 1 capsule (100 mg) by mouth twice a day., Disp: , Rfl:     ibuprofen (IBU) 600 mg tablet, Take 1 tablet (600 mg) by mouth every 6 hours if needed for mild pain (1 - 3) or fever (temp greater than 38.0 C)., Disp: , Rfl:     iron,carb/vit C/vit B12/folic (IRON 100 PLUS ORAL), Iron 100 Plus  Quantity: 0  Refills: 0  Ordered: 18-Nov-2022 Evelyn Orantes Generic Substitution Allowed, Disp: , Rfl:     loratadine-pseudoephedrine (Claritin-D 24-hour)  mg 24 hr tablet, Take 1 tablet by mouth once daily., Disp: , Rfl:     meloxicam (Mobic) 15 mg tablet, Take 1 tablet (15 mg) by mouth once daily., Disp: , Rfl:     polyethylene glycol (Glycolax, Miralax) 17 gram/dose powder, Take 17 g by mouth once daily. MIX IN 8 OZ OF WATER, Disp: , Rfl:     propranolol LA (Inderal LA) 60 mg 24 hr capsule, Take by mouth., Disp: , Rfl:     rimegepant (Nurtec ODT) 75 mg tablet,disintegrating, Take 1 tablet (75 mg) by mouth once daily as needed., Disp: , Rfl:     rizatriptan  "(Maxalt) 5 mg tablet, Take 1 tablet (5 mg) by mouth., Disp: , Rfl:     SUMAtriptan (Imitrex) 50 mg tablet, Take 1 tablet (50 mg) by mouth 1 time if needed for migraine (TAKE AT ONSET, MAY REPEAT IN 2 HOURS IF NO RELIEF)., Disp: , Rfl:     topiramate (Topamax) 25 mg tablet, Topiramate 25 MG Oral Tablet  Quantity: 60  Refills: 0      Start : 15-Aug-2022  Active, Disp: , Rfl:     valACYclovir (Valtrex) 1 gram tablet, Take by mouth., Disp: , Rfl:   No Known Allergies    Objective     /74   Pulse 86   Temp 36.6 °C (97.8 °F)   Ht 1.651 m (5' 5\")   Wt 58.5 kg (129 lb)   BMI 21.47 kg/m²     CONSTITUTIONAL:  No acute distress    HEENT:  right frontal scalp bump with tenderness, but no erythema, edema, or mobility.    SPINE:  No spine tenderness or paraspinal muscle tightness.    CARDIOVASCULAR:  Normal pulses in the distal legs, no edema of either arm or either leg.  No carotid bruits.    MENTAL STATUS:  Awake, alert, fully oriented to self, place, and time, with present short-term memory, good awareness of recent events, normal attention span, concentration, and fund of knowledge.    SPEECH AND LANGUAGE:  Can name and repeat, follows all commands, has no dysarthria    FUNDOSCOPIC:  No papilledema    CRANIAL NERVES:  II-Vision present, visual fields full to confrontational testing    III/IV/VI--EOMs are present in all directions.  Pupils are symmetrically reactive in dim light.  No ptosis.    V--Normal facial sensation.    VII--No facial asymmetry.    VIII--Hearing present to voice bilaterally.    IX/X--Symmetric soft palate rise.    XI--Normal trapezius power bilaterally.    XII--Tongue protrudes without deviation.    MOTOR:  Normal power, tone, and bulk in both arms and both legs.    SENSORY:  Normal pin sensation in both arms and both legs without distal-proximal gradient, asymmetry, or spinal sensory level.    COORDINATION:  Normal finger-to-nose and heel-to-shin testing in both arms and both legs.    REFLEXES " are normal and symmetric at the biceps, triceps, brachioradialis, patella, and ankle.  The plantar responses are flexor.    GAIT is normal, without steppage, ataxia, shuffling, or spasticity.    MRI BRAIN W/WO CONTRAST;  4/13/2023 1:39 pm     INDICATION:  Chronic Daily Headache  R51.9: Chronic daily headache R90.89:  Abnormal brain MRI.     COMPARISON:  July 2019.     ACCESSION NUMBER(S):  [id number]     ORDERING CLINICIAN:  KAELYN NESBITT     TECHNIQUE:  The brain was studied in the sagittal, axial and coronal planes  utilizing FLAIR, T1 and T2 weighted images.     Following intravenous  injection of gadolinium contrast, T1 weighted fat suppressed  multiplanar images were also performed.     FINDINGS:  There is a normal-size ventricular system.  There is no evidence of  intracranial mass or extra-axial collection.     There is a 1 cm by 2 cm focus of abnormal signal in the right frontal  calvarium with a narrow zone of transition and abnormal enhancement  following contrast injection. It is unchanged in appearance and size  compared to the previous exam but nonspecific and consistent with  hemangioma, venous [place] or fiber osseous abnormality of bone. No  associated diffusion restriction or fat contents.     The skull base, paranasal sinuses and orbital structures are  unremarkable. Diffusion weighted images and associated ADC maps of  the brain were unremarkable.  There is no evidence of diffusion  restriction to suggest the presence of acute infarction. Gradient  echo T2 weighted images fail to demonstrate hemosiderin deposition or  other evidence of hemorrhage.     Following intravenous injection of there is no abnormal enhancement.  There is normal contrast opacification of the dural venous sinuses.      Impression   * There is no evidence of mass, infarction or hemorrhage  *Unknown calvarial abnormality in the right frontal region can not be  further characterized.       Gilmar Venegas Jr., M.D., FAAN

## 2024-06-19 ENCOUNTER — SPECIALTY PHARMACY (OUTPATIENT)
Dept: PHARMACY | Facility: CLINIC | Age: 34
End: 2024-06-19

## 2024-07-03 ENCOUNTER — SPECIALTY PHARMACY (OUTPATIENT)
Dept: PHARMACY | Facility: CLINIC | Age: 34
End: 2024-07-03

## 2024-07-03 PROCEDURE — RXMED WILLOW AMBULATORY MEDICATION CHARGE

## 2024-07-09 ENCOUNTER — PHARMACY VISIT (OUTPATIENT)
Dept: PHARMACY | Facility: CLINIC | Age: 34
End: 2024-07-09
Payer: MEDICAID

## 2024-07-12 ENCOUNTER — SPECIALTY PHARMACY (OUTPATIENT)
Dept: PHARMACY | Facility: CLINIC | Age: 34
End: 2024-07-12

## 2024-07-17 ENCOUNTER — APPOINTMENT (OUTPATIENT)
Dept: NEUROLOGY | Facility: CLINIC | Age: 34
End: 2024-07-17
Payer: COMMERCIAL

## 2024-08-27 ENCOUNTER — APPOINTMENT (OUTPATIENT)
Dept: OBSTETRICS AND GYNECOLOGY | Facility: CLINIC | Age: 34
End: 2024-08-27
Payer: COMMERCIAL

## 2024-09-20 ENCOUNTER — SPECIALTY PHARMACY (OUTPATIENT)
Dept: PHARMACY | Facility: CLINIC | Age: 34
End: 2024-09-20

## 2024-10-15 ENCOUNTER — SPECIALTY PHARMACY (OUTPATIENT)
Dept: PHARMACY | Facility: CLINIC | Age: 34
End: 2024-10-15

## 2024-10-17 ENCOUNTER — APPOINTMENT (OUTPATIENT)
Dept: OBSTETRICS AND GYNECOLOGY | Facility: CLINIC | Age: 34
End: 2024-10-17
Payer: COMMERCIAL

## 2024-11-04 ENCOUNTER — APPOINTMENT (OUTPATIENT)
Dept: OBSTETRICS AND GYNECOLOGY | Facility: CLINIC | Age: 34
End: 2024-11-04
Payer: COMMERCIAL

## 2024-12-26 ENCOUNTER — APPOINTMENT (OUTPATIENT)
Dept: OBSTETRICS AND GYNECOLOGY | Facility: CLINIC | Age: 34
End: 2024-12-26
Payer: COMMERCIAL

## 2025-01-20 ENCOUNTER — APPOINTMENT (OUTPATIENT)
Dept: NEUROLOGY | Facility: CLINIC | Age: 35
End: 2025-01-20
Payer: COMMERCIAL

## 2025-02-17 ENCOUNTER — APPOINTMENT (OUTPATIENT)
Dept: NEUROLOGY | Facility: CLINIC | Age: 35
End: 2025-02-17
Payer: COMMERCIAL

## 2025-02-20 ENCOUNTER — APPOINTMENT (OUTPATIENT)
Dept: NEUROLOGY | Facility: CLINIC | Age: 35
End: 2025-02-20
Payer: COMMERCIAL

## 2025-02-20 ENCOUNTER — TELEMEDICINE (OUTPATIENT)
Dept: NEUROLOGY | Facility: CLINIC | Age: 35
End: 2025-02-20
Payer: COMMERCIAL

## 2025-02-20 DIAGNOSIS — M25.511 ACUTE PAIN OF RIGHT SHOULDER: Primary | ICD-10-CM

## 2025-02-20 DIAGNOSIS — G43.019 INTRACTABLE MIGRAINE WITHOUT AURA AND WITHOUT STATUS MIGRAINOSUS: ICD-10-CM

## 2025-02-20 PROCEDURE — 1036F TOBACCO NON-USER: CPT | Performed by: PSYCHIATRY & NEUROLOGY

## 2025-02-20 PROCEDURE — 99213 OFFICE O/P EST LOW 20 MIN: CPT | Performed by: PSYCHIATRY & NEUROLOGY

## 2025-02-20 RX ORDER — PREDNISONE 10 MG/1
TABLET ORAL
Qty: 15 TABLET | Refills: 0 | Status: SHIPPED | OUTPATIENT
Start: 2025-02-20

## 2025-02-20 NOTE — PROGRESS NOTES
"NEUROLOGY OUTPATIENT FOLLOW-UP NOTE    Assessment/Plan   Diagnoses and all orders for this visit:  Acute pain of right shoulder  -     Referral to Orthopedics and Sports Medicine; Future  -     predniSONE (Deltasone) 10 mg tablet; 5 by mouth daily x 1 dy, 4 daily x 1 dy, 3 daily x 1 dy, 2 daily x 1 dy, 1 daily x 1 dy  Intractable migraine without aura and without status migrainosus      IMPRESSION:  Non-neurological right shoulder pain and additional cervical strain.    PLAN: I referred the patient to Ortho for the shoulder pain.  In the meantime, prednisone taper.  I will see her for the headache at the next visit, which I advised for in person.      Gilmar Venegas Jr., M.D., [facility]   ----------    Virtual or Telephone Consent    An interactive audio and video telecommunication system which permits real time communications between the patient (at the originating site) and provider (at the distant site) was utilized to provide this telehealth service.   Verbal consent was requested and obtained from Va Diehl on this date, 02/20/25 for a telehealth visit.        Subjective     Va Diehl is a 34 y.o. year old female here for follow-up.    She never started Aimovig.  Head pressure frequency is therefore the same.    She reports \"weakness\" in the right arm about two weeks ago.  By this, she means she has discomfort in the right shoulder upon  abduction and flexion of the right arm at the shoulder.  She has had pain in the shoulder chronically, but it has been worse in the last two weeks.  She has intermittent tingling of the proximal arm.  She has posterior cervical discomfort chronically, but also worse in the last two weeks.    She denies other focal neurological symptoms including dysarthria, dysphagia, diplopia, focal weakness, other focal sensory change, ataxia, vertigo, or bowel/bladder incontinence, among others.      Past Medical History:   Diagnosis Date    Anxiety and depression     Chronic headaches     " Fibromyalgia      No past surgical history on file.  Social History     Tobacco Use    Smoking status: Never     Passive exposure: Never    Smokeless tobacco: Never   Substance Use Topics    Alcohol use: Never     family history includes Cerebral aneurysm in her mother; Colon cancer in her mother's sister; Hypertension in her mother; Stroke in her father and maternal grandmother.    Current Outpatient Medications:     acyclovir (Zovirax) 400 mg tablet, Acyclovir 400 MG Oral Tablet  Quantity: 30  Refills: 0      Start : 7-May-2022  Active, Disp: , Rfl:     albuterol (Proventil HFA) 90 mcg/actuation inhaler, Inhale 2 puffs every 4 hours if needed for wheezing or shortness of breath. (Patient not taking: Reported on 2/20/2025), Disp: , Rfl:     benzocaine-menthoL (Cepacol Sore Throat, hanna-men,) 15-2.6 mg lozenge, Dissolve 1 lozenge in the mouth every 4 hours if needed., Disp: , Rfl:     brompheniramine-pseudoeph-DM 2-30-10 mg/5 mL syrup, Take 10 mL by mouth every 6 hours if needed for congestion or cough., Disp: , Rfl:     butalbital-acetaminophen-caff -40 mg tablet, Take 1 tablet by mouth., Disp: , Rfl:     clonazePAM (KlonoPIN) 0.5 mg tablet, clonazePAM 0.5 MG Oral Tablet  Quantity: 15  Refills: 0      Start : 6-Oct-2021  Active, Disp: , Rfl:     cyclobenzaprine (Flexeril) 10 mg tablet, Cyclobenzaprine HCl - 10 MG Oral Tablet  Quantity: 90  Refills: 0      Start : 15-Aug-2022  Active, Disp: , Rfl:     desog-e.estradioL/e.estradioL (Kariva) 0.15-0.02 mgx21 /0.01 mg x 5 tablet, Take 1 tablet by mouth once daily., Disp: , Rfl:     DULoxetine (Cymbalta) 30 mg DR capsule, Take 1 capsule (30 mg) by mouth twice a day., Disp: , Rfl:     DULoxetine (Cymbalta) 60 mg DR capsule, Take 1 capsule (60 mg) by mouth once daily., Disp: , Rfl:     DULoxetine 40 mg DR capsule, Take 1 capsule (40 mg) by mouth once daily., Disp: , Rfl:     ferrous sulfate 325 (65 Fe) MG tablet, Ferrous Sulfate 325 (65 Fe) MG Oral Tablet  Quantity:  30  Refills: 0      Start : 8-Jul-2022  Active, Disp: , Rfl:     fluticasone (Flonase) 50 mcg/actuation nasal spray, Administer 1 spray into each nostril., Disp: , Rfl:     gabapentin (Neurontin) 100 mg capsule, Take 1 capsule (100 mg) by mouth twice a day., Disp: , Rfl:     ibuprofen (IBU) 600 mg tablet, Take 1 tablet (600 mg) by mouth every 6 hours if needed for mild pain (1 - 3) or fever (temp greater than 38.0 C)., Disp: , Rfl:     iron,carb/vit C/vit B12/folic (IRON 100 PLUS ORAL), Iron 100 Plus  Quantity: 0  Refills: 0  Ordered: 18-Nov-2022 Evelyn Orantes Generic Substitution Allowed, Disp: , Rfl:     loratadine-pseudoephedrine (Claritin-D 24-hour)  mg 24 hr tablet, Take 1 tablet by mouth once daily., Disp: , Rfl:     meloxicam (Mobic) 15 mg tablet, Take 1 tablet (15 mg) by mouth once daily., Disp: , Rfl:     polyethylene glycol (Glycolax, Miralax) 17 gram/dose powder, Take 17 g by mouth once daily. MIX IN 8 OZ OF WATER, Disp: , Rfl:     propranolol LA (Inderal LA) 60 mg 24 hr capsule, Take by mouth., Disp: , Rfl:     rimegepant (Nurtec ODT) 75 mg tablet,disintegrating, Take 1 tablet (75 mg) by mouth once daily as needed., Disp: , Rfl:     rizatriptan (Maxalt) 5 mg tablet, Take 1 tablet (5 mg) by mouth., Disp: , Rfl:     SUMAtriptan (Imitrex) 50 mg tablet, Take 1 tablet (50 mg) by mouth 1 time if needed for migraine (TAKE AT ONSET, MAY REPEAT IN 2 HOURS IF NO RELIEF)., Disp: , Rfl:     topiramate (Topamax) 25 mg tablet, Topiramate 25 MG Oral Tablet  Quantity: 60  Refills: 0      Start : 15-Aug-2022  Active, Disp: , Rfl:     valACYclovir (Valtrex) 1 gram tablet, Take by mouth., Disp: , Rfl:   No Known Allergies    Objective     There were no vitals taken for this visit. (Virtual)    CONSTITUTIONAL:  No acute distress    EXTREMITIES:    limited right arm flexion and extension at the shoulder due to pain.    MENTAL STATUS:  Awake, alert, fully oriented to self, place, and time, with present short-term  memory, good awareness of recent events, normal attention span, concentration, and fund of knowledge.    SPEECH AND LANGUAGE:  Can name and repeat, follows all commands, has no dysarthria    FUNDOSCOPIC:  Deferred because of virtual visit    CRANIAL NERVES:  II-Vision grossly present, visual fields deferred because of virtual visit    III/IV/VI--EOMs are present in all directions.  No ptosis.  Pupillary response deferred because of virtual visit    V--Normal jaw movement.  Sensation deferred because of virtual visit    VII--No facial asymmetry.    VIII--Hearing grossly present given ability to hear me on the call.    IX/X--Symmetric soft palate rise.    XI--Antigravity, symmetrical trapezius power bilaterally.    XII--Tongue protrudes without deviation.    MOTOR:  Symmetric, antigravity power of both arms and both legs    SENSORY:  Deferred because of virtual visit    COORDINATION:  Normal finger-to-nose and heel-to-shin testing in both arms and both legs.    REFLEXES Deferred because of virtual visit    GAIT Deferred because of virtual visit       Gilmar Venegas Jr., M.D., FAAN

## 2025-02-27 ENCOUNTER — APPOINTMENT (OUTPATIENT)
Dept: PRIMARY CARE | Facility: CLINIC | Age: 35
End: 2025-02-27
Payer: COMMERCIAL

## 2025-05-27 ENCOUNTER — APPOINTMENT (OUTPATIENT)
Dept: NEUROLOGY | Facility: CLINIC | Age: 35
End: 2025-05-27
Payer: COMMERCIAL

## 2025-06-20 ENCOUNTER — APPOINTMENT (OUTPATIENT)
Dept: NEUROLOGY | Facility: CLINIC | Age: 35
End: 2025-06-20
Payer: COMMERCIAL

## 2025-07-10 ENCOUNTER — APPOINTMENT (OUTPATIENT)
Dept: PRIMARY CARE | Facility: CLINIC | Age: 35
End: 2025-07-10
Payer: COMMERCIAL

## 2025-09-10 ENCOUNTER — APPOINTMENT (OUTPATIENT)
Dept: PRIMARY CARE | Facility: CLINIC | Age: 35
End: 2025-09-10
Payer: COMMERCIAL

## 2025-10-27 ENCOUNTER — APPOINTMENT (OUTPATIENT)
Dept: NEUROLOGY | Facility: CLINIC | Age: 35
End: 2025-10-27
Payer: COMMERCIAL

## 2025-11-03 ENCOUNTER — APPOINTMENT (OUTPATIENT)
Dept: NEUROLOGY | Facility: CLINIC | Age: 35
End: 2025-11-03
Payer: COMMERCIAL